# Patient Record
Sex: MALE
[De-identification: names, ages, dates, MRNs, and addresses within clinical notes are randomized per-mention and may not be internally consistent; named-entity substitution may affect disease eponyms.]

---

## 2020-12-31 ENCOUNTER — HOSPITAL ENCOUNTER (EMERGENCY)
Dept: HOSPITAL 56 - MW.ED | Age: 55
Discharge: HOME | End: 2020-12-31
Payer: COMMERCIAL

## 2020-12-31 DIAGNOSIS — K57.32: Primary | ICD-10-CM

## 2020-12-31 DIAGNOSIS — Z88.7: ICD-10-CM

## 2020-12-31 DIAGNOSIS — Z20.828: ICD-10-CM

## 2020-12-31 DIAGNOSIS — I10: ICD-10-CM

## 2020-12-31 DIAGNOSIS — Z79.899: ICD-10-CM

## 2020-12-31 LAB
BUN SERPL-MCNC: 19 MG/DL (ref 7–18)
CHLORIDE SERPL-SCNC: 102 MMOL/L (ref 98–107)
CO2 SERPL-SCNC: 24.8 MMOL/L (ref 21–32)
FLUAV RNA UPPER RESP QL NAA+PROBE: NEGATIVE
FLUBV RNA UPPER RESP QL NAA+PROBE: NEGATIVE
GLUCOSE SERPL-MCNC: 170 MG/DL (ref 74–106)
LIPASE SERPL-CCNC: 72 U/L (ref 73–393)
POTASSIUM SERPL-SCNC: 3.3 MMOL/L (ref 3.5–5.1)
SARS-COV-2 RNA RESP QL NAA+PROBE: NEGATIVE
SODIUM SERPL-SCNC: 138 MMOL/L (ref 136–148)

## 2020-12-31 RX ADMIN — Medication PRN ML: at 10:54

## 2020-12-31 RX ADMIN — IOPAMIDOL STA ML: 755 INJECTION, SOLUTION INTRAVENOUS at 12:32

## 2020-12-31 RX ADMIN — CEFEPIME HYDROCHLORIDE ONE MLS/HR: 2 INJECTION, SOLUTION INTRAVENOUS at 11:23

## 2020-12-31 RX ADMIN — KETOROLAC TROMETHAMINE ONE MG: 30 INJECTION, SOLUTION INTRAMUSCULAR at 11:36

## 2020-12-31 RX ADMIN — SODIUM CHLORIDE, PRESERVATIVE FREE PRN ML: 5 INJECTION INTRAVENOUS at 10:54

## 2020-12-31 RX ADMIN — ONDANSETRON ONE MG: 2 INJECTION, SOLUTION INTRAMUSCULAR; INTRAVENOUS at 10:54

## 2020-12-31 NOTE — CT
Indication:



Abdominal pain and fever



Technique:



Volumetric multidetector CT images of the abdomen and pelvis were obtained 

after the administration of intravenous contrast.



100 cc Isovue 370 low osmolar



Comparison:



None available.



Findings:



The lung bases are clear.



The liver is moderately enlarged with hepatic steatosis. There is mild 

focal fatty sparing adjacent to the gallbladder fossa.



The portal vein is patent.



The gallbladder is unremarkable without evidence of radiopaque calculus.



There is no significant common biliary ductal dilatation or abrupt cut off.



The spleen is normal in enhancement and size.



The stomach and duodenum are grossly unremarkable.



The pancreas is normal in enhancement without significant atrophy.



The adrenal glands are unremarkable.



The kidneys demonstrate preserved corticomedullary differentiation without 

evidence of obstructive uropathy.



There is a moderate amount of colonic diverticulosis with focal segmental 

thickening and likely mild pericolonic inflammatory change of the mid 

sigmoid colon.



The appendix is unremarkable.



There is no significant mesenteric, retroperitoneal, or pelvic sidewall 

lymph nodes.



The aorta is nonaneurysmal.  There is no significant atherosclerotic 

disease appreciated.



The solid pelvic viscera are grossly unremarkable.



There is no free fluid or free air.



The anterior abdominal wall is intact without significant hernias.



The lumbar vertebral body heights are grossly maintained in satisfactory 

alignment without evidence of displaced fracture, lytic or blastic lesion.



Impression:



Moderate colonic diverticulosis with suggestion of focal segmental 

thickening and pericolonic inflammatory change of the mid sigmoid colon 

likely representing low-grade diverticulitis. 



Moderate hepatomegaly and hepatic steatosis.



Please note that all CT scans at this facility use dose modulation, 

iterative reconstruction, and/or weight-based dosing when appropriate to 

reduce radiation dose to as low as reasonably achievable.



Dictated by Ramirez Rivera MD @ Dec 31 2020 12:54PM



Signed by Dr. Ramirez Rivera @ Dec 31 2020  1:03PM

## 2020-12-31 NOTE — CR
Indication:



Cough. Fever.



Technique:



Chest 1 view.



Comparison:



02/25/2020.



Findings:



Cardiomediastinal silhouette is unremarkable. No focal lung consolidation, 

pleural effusion or pneumothorax. No acute osseous abnormality.



Impression:



No acute cardiopulmonary abnormality.



Dictated by Lewis Packer MD @ Dec 31 2020 11:25AM



Signed by Dr. Lewis Packer @ Dec 31 2020 11:26AM

## 2020-12-31 NOTE — EDM.PDOC
ED HPI GENERAL MEDICAL PROBLEM





- General


Chief Complaint: General


Stated Complaint: POSSIBLE FLU SYMPTOMS


Time Seen by Provider: 12/31/20 10:26





- History of Present Illness


INITIAL COMMENTS - FREE TEXT/NARRATIVE: 


55-year-old male with history of hypertension as well as rheumatoid arthritis 

for which he takes Humira who is presenting with multiple symptoms.  Patient 

states he was in his normal state of health until 3 AM this morning when he woke

up with significant nausea nonbloody nonbilious emesis as well as small volume 

diarrhea and some ongoing dry heaves.  Is associated with fatigue cough and 

severe chills.  He was seen briefly at a walk-in clinic rapid Covid antigen test

was negative at that time and he was referred in for additional work-up.  

Patient reports a constant burning nonradiating substernal chest discomfort as 

well.  He denies abdominal pain he denies prior history of abdominal surgery.  

He reports that he had a similar but less severe episode in January that was 

thought to be pneumonia.  No exacerbating or alleviating factors radiation or 

other associated symptoms.


  ** Chest


Pain Score (Numeric/FACES): 4





- Related Data


                                    Allergies











Allergy/AdvReac Type Severity Reaction Status Date / Time


 


rabies immune globulin Allergy  Cannot Verified 12/31/20 10:28





[Rabies Immune Globulin]   Remember  











Home Meds: 


                                    Home Meds





Ciprofloxacin [Ciprofloxacin HCl] 500 mg PO BID 7 Days #14 tab 12/31/20 [Rx]


Metoprolol/Hydrochlorothiazide [Metoprolol-HCTZ 100-25 MG] 1 tab PO DAILY 

12/31/20 [History]


hydroCHLOROthiazide [Hydrochlorothiazide] 1 tab PO DAILY 12/31/20 [History]


metroNIDAZOLE [Flagyl] 500 mg PO Q8H 7 Days #21 tab 12/31/20 [Rx]


sulfaSALAzine [Sulfazine] 1 tab PO DAILY 12/31/20 [History]











ED ROS GENERAL





- Review of Systems


Review Of Systems: See Below


Free Text/Narrative/Comment: 


General: Per HPI


Skin: No rash.


Eyes: No vision problems.


ENT: No sore throat.


Neck: No neck stiffness.


Respiratory: Per HPI


Cardiac: Per HPI


Gastrointestinal: Per HPI


Musculoskeletal: Per HPI


Neurologic: No headache.





ED EXAM, GENERAL





- Physical Exam


Exam: See Below


Free Text/Narrative:: 





General Appearance: Pale, diaphoretic, but in no acute distress


Skin: No rash


HEENT: Normocephalic/atraumatic, sclera anicteric, mucous membranes moist


Neck: Normal range of motion


Chest and Lungs: Speaks in short sentences, breath sounds clear at the apices 

but would not or could not lean forward for full pulmonary exam


Cardiovascular: Regular rate and rhythm, no murmur


Abdomen: Soft, non-tender


Back: Normal


Musculoskeletal: No edema or tenderness


Neurologic: Awake, alert, no obvious deficits, moving all extremities


Psychiatric: Appropriate, cooperative


  ** #1 Interpretation


EKG Date: 12/31/20


Time: 10:35


EKG Interpretation Comments: 





Normal sinus rhythm with a rate of 70 normal intervals and axis no acute 

ischemia





Course





- Vital Signs


Last Recorded V/S: 


                                Last Vital Signs











Temp  97.1 F   12/31/20 11:00


 


Pulse  92   12/31/20 12:30


 


Resp  17   12/31/20 12:30


 


BP  170/95 H  12/31/20 12:30


 


Pulse Ox  97   12/31/20 12:30














- Orders/Labs/Meds


Orders: 


                               Active Orders 24 hr











 Category Date Time Status


 


 EKG Documentation Completion [RC] STAT Care  12/31/20 10:28 Active


 


 CULTURE BLOOD [BC] Stat Lab  12/31/20 10:34 Received


 


 CULTURE BLOOD [BC] Stat Lab  12/31/20 11:34 Received


 


 Sodium Chloride 0.9% [Saline Flush] Med  12/31/20 10:28 Active





 10 ml FLUSH ASDIRECTED PRN   


 


 Sodium Chloride 0.9% [Saline Flush] Med  12/31/20 10:28 Active





 2.5 ml FLUSH ASDIRECTED PRN   


 


 Blood Culture x2 Reflex Set [OM.PC] Stat Oth  12/31/20 11:00 Ordered


 


 Saline Lock Insert [OM.PC] Stat Oth  12/31/20 10:28 Ordered








                                Medication Orders





Sodium Chloride (Saline Flush)  10 ml FLUSH ASDIRECTED PRN


   PRN Reason: Keep Vein Open


   Last Admin: 12/31/20 10:54  Dose: 10 ml


   Documented by: [id number]


Sodium Chloride (Saline Flush)  2.5 ml FLUSH ASDIRECTED PRN


   PRN Reason: Keep Vein Open


   Last Admin: 12/31/20 10:54  Dose: 2.5 ml


   Documented by: [id number]








Labs: 


                                Laboratory Tests











  12/31/20 12/31/20 12/31/20 Range/Units





  10:35 10:35 10:35 


 


WBC  17.58 H    (4.0-11.0)  K/uL


 


RBC  5.10    (4.50-5.90)  M/uL


 


Hgb  16.0    (13.0-17.0)  g/dL


 


Hct  46.8    (38.0-50.0)  %


 


MCV  91.8    (80.0-98.0)  fL


 


MCH  31.4    (27.0-32.0)  pg


 


MCHC  34.2    (31.0-37.0)  g/dL


 


RDW Std Deviation  44.4    (28.0-62.0)  fl


 


RDW Coeff of Earl  13    (11.0-15.0)  %


 


Plt Count  204    (150-400)  K/uL


 


MPV  10.20    (7.40-12.00)  fL


 


Neut % (Auto)  83.9 H    (48.0-80.0)  %


 


Lymph % (Auto)  9.8 L    (16.0-40.0)  %


 


Mono % (Auto)  6.0    (0.0-15.0)  %


 


Eos % (Auto)  0.1    (0.0-7.0)  %


 


Baso % (Auto)  0.2    (0.0-1.5)  %


 


Neut # (Auto)  14.8 H    (1.4-5.7)  K/uL


 


Lymph # (Auto)  1.7    (0.6-2.4)  K/uL


 


Mono # (Auto)  1.1 H    (0.0-0.8)  K/uL


 


Eos # (Auto)  0.0    (0.0-0.7)  K/uL


 


Baso # (Auto)  0.0    (0.0-0.1)  K/uL


 


Nucleated RBC %  0.0    /100WBC


 


Nucleated RBCs #  0    K/uL


 


Lactate   2.3 H*   (0.20-2.00)  mmol/L


 


Sodium    138  (136-148)  mmol/L


 


Potassium    3.3 L  (3.5-5.1)  mmol/L


 


Chloride    102  ()  mmol/L


 


Carbon Dioxide    24.8  (21.0-32.0)  mmol/L


 


BUN    19 H  (7.0-18.0)  mg/dL


 


Creatinine    1.0  (0.8-1.3)  mg/dL


 


Est Cr Clr Drug Dosing    79.80  mL/min


 


Estimated GFR (MDRD)    > 60.0  ml/min


 


Glucose    170 H  ()  mg/dL


 


Calcium    9.7  (8.5-10.1)  mg/dL


 


Total Bilirubin    0.9  (0.2-1.0)  mg/dL


 


AST    36  (15-37)  IU/L


 


ALT    63  (14-63)  IU/L


 


Alkaline Phosphatase    55  ()  U/L


 


Troponin I    < 0.050  (0.000-0.056)  ng/mL


 


Total Protein    8.3 H  (6.4-8.2)  g/dL


 


Albumin    5.0  (3.4-5.0)  g/dL


 


Globulin    3.3  (2.6-4.0)  g/dL


 


Albumin/Globulin Ratio    1.5  (0.9-1.6)  


 


Lipase    72 L  ()  U/L


 


Urine Color     


 


Urine Appearance     


 


Urine pH     (5.0-8.0)  


 


Ur Specific Gravity     (1.001-1.035)  


 


Urine Protein     (NEGATIVE)  mg/dL


 


Urine Glucose (UA)     (NEGATIVE)  mg/dL


 


Urine Ketones     (NEGATIVE)  mg/dL


 


Urine Occult Blood     (NEGATIVE)  


 


Urine Nitrite     (NEGATIVE)  


 


Urine Bilirubin     (NEGATIVE)  


 


Urine Urobilinogen     (<2.0)  EU/dL


 


Ur Leukocyte Esterase     (NEGATIVE)  


 


Urine RBC     (0-2/HPF)  


 


Urine WBC     (0-5/HPF)  


 


Ur Epithelial Cells     (NONE-FEW)  


 


Urine Bacteria     (NEGATIVE)  


 


Urine Mucus     (NONE-MOD)  


 


Influenza Type A RNA     (NEGATIVE)  


 


Influenza Type B RNA     (NEGATIVE)  


 


SARS-CoV-2 RNA (DIA)     (NEGATIVE)  














  12/31/20 12/31/20 12/31/20 Range/Units





  10:45 12:29 13:24 


 


WBC     (4.0-11.0)  K/uL


 


RBC     (4.50-5.90)  M/uL


 


Hgb     (13.0-17.0)  g/dL


 


Hct     (38.0-50.0)  %


 


MCV     (80.0-98.0)  fL


 


MCH     (27.0-32.0)  pg


 


MCHC     (31.0-37.0)  g/dL


 


RDW Std Deviation     (28.0-62.0)  fl


 


RDW Coeff of Earl     (11.0-15.0)  %


 


Plt Count     (150-400)  K/uL


 


MPV     (7.40-12.00)  fL


 


Neut % (Auto)     (48.0-80.0)  %


 


Lymph % (Auto)     (16.0-40.0)  %


 


Mono % (Auto)     (0.0-15.0)  %


 


Eos % (Auto)     (0.0-7.0)  %


 


Baso % (Auto)     (0.0-1.5)  %


 


Neut # (Auto)     (1.4-5.7)  K/uL


 


Lymph # (Auto)     (0.6-2.4)  K/uL


 


Mono # (Auto)     (0.0-0.8)  K/uL


 


Eos # (Auto)     (0.0-0.7)  K/uL


 


Baso # (Auto)     (0.0-0.1)  K/uL


 


Nucleated RBC %     /100WBC


 


Nucleated RBCs #     K/uL


 


Lactate    2.0  (0.20-2.00)  mmol/L


 


Sodium     (136-148)  mmol/L


 


Potassium     (3.5-5.1)  mmol/L


 


Chloride     ()  mmol/L


 


Carbon Dioxide     (21.0-32.0)  mmol/L


 


BUN     (7.0-18.0)  mg/dL


 


Creatinine     (0.8-1.3)  mg/dL


 


Est Cr Clr Drug Dosing     mL/min


 


Estimated GFR (MDRD)     ml/min


 


Glucose     ()  mg/dL


 


Calcium     (8.5-10.1)  mg/dL


 


Total Bilirubin     (0.2-1.0)  mg/dL


 


AST     (15-37)  IU/L


 


ALT     (14-63)  IU/L


 


Alkaline Phosphatase     ()  U/L


 


Troponin I     (0.000-0.056)  ng/mL


 


Total Protein     (6.4-8.2)  g/dL


 


Albumin     (3.4-5.0)  g/dL


 


Globulin     (2.6-4.0)  g/dL


 


Albumin/Globulin Ratio     (0.9-1.6)  


 


Lipase     ()  U/L


 


Urine Color   YELLOW   


 


Urine Appearance   CLEAR   


 


Urine pH   8.0   (5.0-8.0)  


 


Ur Specific Gravity   1.020   (1.001-1.035)  


 


Urine Protein   NEGATIVE   (NEGATIVE)  mg/dL


 


Urine Glucose (UA)   NEGATIVE   (NEGATIVE)  mg/dL


 


Urine Ketones   40 H   (NEGATIVE)  mg/dL


 


Urine Occult Blood   TRACE-INTACT H   (NEGATIVE)  


 


Urine Nitrite   NEGATIVE   (NEGATIVE)  


 


Urine Bilirubin   NEGATIVE   (NEGATIVE)  


 


Urine Urobilinogen   0.2   (<2.0)  EU/dL


 


Ur Leukocyte Esterase   NEGATIVE   (NEGATIVE)  


 


Urine RBC   2-3   (0-2/HPF)  


 


Urine WBC   0-1   (0-5/HPF)  


 


Ur Epithelial Cells   RARE   (NONE-FEW)  


 


Urine Bacteria   RARE   (NEGATIVE)  


 


Urine Mucus   RARE   (NONE-MOD)  


 


Influenza Type A RNA  NEGATIVE    (NEGATIVE)  


 


Influenza Type B RNA  NEGATIVE    (NEGATIVE)  


 


SARS-CoV-2 RNA (DIA)  NEGATIVE    (NEGATIVE)  











Meds: 


Medications











Generic Name Dose Route Start Last Admin





  Trade Name Freq  PRN Reason Stop Dose Admin


 


Sodium Chloride  10 ml  12/31/20 10:28  12/31/20 10:54





  Saline Flush  FLUSH   10 ml





  ASDIRECTED PRN   Administration





  Keep Vein Open  


 


Sodium Chloride  2.5 ml  12/31/20 10:28  12/31/20 10:54





  Saline Flush  FLUSH   2.5 ml





  ASDIRECTED PRN   Administration





  Keep Vein Open  














Discontinued Medications














Generic Name Dose Route Start Last Admin





  Trade Name Freq  PRN Reason Stop Dose Admin


 


Lactated Ringer's  1,000 mls @ 999 mls/hr  12/31/20 10:55  12/31/20 10:57





  Ringers, Lactated  IV  12/31/20 11:55  999 mls/hr





  .BOLUS ONE   Administration


 


Cefepime HCl 2 gm/ Premix  50 mls @ 100 mls/hr  12/31/20 11:10  12/31/20 11:23





  IV  12/31/20 11:39  100 mls/hr





  ONETIME ONE   Administration


 


Vancomycin HCl 1 gm/ Sodium  250 mls @ 166 mls/hr  12/31/20 11:10  12/31/20 

11:23





  Chloride  IV  12/31/20 12:40  166 mls/hr





  ONETIME ONE   Administration


 


Iopamidol  100 ml  12/31/20 12:29  12/31/20 12:32





  Isovue Multipack-370 (76%)  IVPUSH  12/31/20 12:30  100 ml





  ONETIME STA   Administration


 


Ketorolac Tromethamine  15 mg  12/31/20 11:32  12/31/20 11:36





  Toradol  IVPUSH  12/31/20 11:33  15 mg





  ONETIME ONE   Administration


 


Ondansetron HCl  4 mg  12/31/20 10:41  12/31/20 10:54





  Zofran  IVPUSH  12/31/20 10:42  4 mg





  ONETIME ONE   Administration














Departure





- Departure


Time of Disposition: 13:39


Disposition: Home, Self-Care 01


Condition: Fair


Clinical Impression: 


 Diverticulitis








- Discharge Information


*PRESCRIPTION DRUG MONITORING PROGRAM REVIEWED*: Not Applicable


*COPY OF PRESCRIPTION DRUG MONITORING REPORT IN PATIENT PONCHO: Not Applicable


Prescriptions: 


Ciprofloxacin [Ciprofloxacin HCl] 500 mg PO BID 7 Days #14 tab


metroNIDAZOLE [Flagyl] 500 mg PO Q8H 7 Days #21 tab


Instructions:  Diverticulitis


Referrals: 


Sterling Dunbar MD [Primary Care Provider] - 1 Week


Forms:  ED Department Discharge


Additional Instructions: 


It is very important that if your symptoms do not improve or if you feel worse 

that you return to the emergency department for another assessment.  The oral 

antibiotics should help with your diverticulitis.  However, it is possible that 

it could worsen in spite of the antibiotics.  If you develop ongoing abdominal 

pain persistent fevers do not improve please return to the ER for another 

evaluation and consideration for admission for IV antibiotics.





The following information is given to patients seen in the emergency department 

who are being discharged to home. This information is to outline your options 

for follow-up care. We provide all patients seen in our emergency department 

with a follow-up referral.





The need for follow-up, as well as the timing and circumstances, are variable 

depending upon the specifics of your emergency department visit.





If you don't have a primary care physician on staff, we will provide you with a 

referral. We always advise you to contact your personal physician following an 

emergency department visit to inform them of the circumstance of the visit and 

for follow-up with them and/or the need for any referrals to a consulting 

specialist.





The emergency department will also refer you to a specialist when appropriate. 

This referral assures that you have the opportunity for follow-up care with a 

specialist. All of these measure are taken in an effort to provide you with 

optimal care, which includes your follow-up.





Under all circumstances we always encourage you to contact your private 

physician who remains a resource for coordinating your care. When calling for 

follow-up care, please make the office aware that this follow-up is from your 

recent emergency room visit. If for any reason you are refused follow-up, please

contact the [facility] Emergency Department

at [phone] and asked to speak to the emergency department charge nurse.





Sepsis Event Note (ED)





- Focused Exam


Vital Signs: 


                                   Vital Signs











  Temp Temp Pulse Resp BP Pulse Ox


 


 12/31/20 12:30    92  17  170/95 H  97


 


 12/31/20 12:00    91   154/84 H  97


 


 12/31/20 11:45    95  18  150/81 H  94 L


 


 12/31/20 11:30    98  18  150/99 H  98


 


 12/31/20 11:15    79  18  158/77 H  96


 


 12/31/20 11:00  97.1 F   77  18  152/87 H  97


 


 12/31/20 10:30   94.8 F L  103 H  18  178/102 H  97














- My Orders


Last 24 Hours: 


My Active Orders





12/31/20 10:28


EKG Documentation Completion [RC] STAT 


Sodium Chloride 0.9% [Saline Flush]   10 ml FLUSH ASDIRECTED PRN 


Sodium Chloride 0.9% [Saline Flush]   2.5 ml FLUSH ASDIRECTED PRN 


Saline Lock Insert [OM.PC] Stat 





12/31/20 10:34


CULTURE BLOOD [BC] Stat 





12/31/20 11:00


Blood Culture x2 Reflex Set [OM.PC] Stat 





12/31/20 11:34


CULTURE BLOOD [BC] Stat 














- Assessment/Plan


Last 24 Hours: 


My Active Orders





12/31/20 10:28


EKG Documentation Completion [RC] STAT 


Sodium Chloride 0.9% [Saline Flush]   10 ml FLUSH ASDIRECTED PRN 


Sodium Chloride 0.9% [Saline Flush]   2.5 ml FLUSH ASDIRECTED PRN 


Saline Lock Insert [OM.PC] Stat 





12/31/20 10:34


CULTURE BLOOD [BC] Stat 





12/31/20 11:00


Blood Culture x2 Reflex Set [OM.PC] Stat 





12/31/20 11:34


CULTURE BLOOD [BC] Stat 











Plan: 


55-year-old male presenting with signs and symptoms that seem most consistent wi

th viral syndrome rapid antigen test for Covid are inherently inaccurate given 

current testing guidelines will swab for Covid and flu.  Pneumonia is a 

possibility chest x-ray ordered patient is not in acute respiratory distress at 

this time.  Abdominal exam is benign I think primary acute abdominal infection 

is unlikely though appendicitis diverticulitis pancreatitis biliary pathology 

were all considered.  Could consider imaging the right upper quadrant if LFTs 

abnormal.  No findings that would necessarily suggest UTI.  Sepsis considered 

lactate CBC CMP pending EKG without acute ischemia but troponin added think ACS 

less likely but also considered.  Nothing that suggest aortic dissection or PE 

to me at this time.





1111: Lactate is only very minimally elevated.  However patient does have some 

signs that suggest early sepsis.  He has a leukocytosis as well.  1 L of LR has 

been ordered.  However, given the minimal lactate elevation the concern for 

potential Covid a full 30 cc/kg fluid bolus is felt to carry significant risk 

for the patient and so will not be ordered.  Single dose of broad-spectrum 

antibiotics ordered as well.





1130:  CXR is unremarkable.  We are awaiting flu/COVID result. If these are 

negative as well then would consider CT a/p to r/o intrabdominal source.  Pt has

 no neck stiffness, signs of meningismus or other indication that would suggest 

meningitis. No AMS that would suggest encephalitis. 





1320: CT scan shows mild diverticulitis without sign of perforation or abscess 

or other complication. Patient's vital signs remain normal. He reports his 

abdominal pain is improved. I recommended admission for continued inpatient 

treatment. However the patient strongly desires discharge. Repeat lactate is 

pending at this time if this is normal then we will send the patient home on 

oral antibiotics with strict return precautions if abnormal and he agrees to 

stay.





My sepsis focused reassessment reveals a nontoxic appearing patient with out 

signs of poor perfusion, normal HR and no hypotension.





1340:  Pt's repeat LA is normal at 2.0.  Given this will dc on cipro / flagyl 

with strict return precautions.

## 2021-05-19 ENCOUNTER — HOSPITAL ENCOUNTER (EMERGENCY)
Dept: HOSPITAL 56 - MW.ED | Age: 56
Discharge: HOME | End: 2021-05-19
Payer: COMMERCIAL

## 2021-05-19 DIAGNOSIS — E78.00: ICD-10-CM

## 2021-05-19 DIAGNOSIS — Z79.899: ICD-10-CM

## 2021-05-19 DIAGNOSIS — I10: ICD-10-CM

## 2021-05-19 DIAGNOSIS — Z88.7: ICD-10-CM

## 2021-05-19 DIAGNOSIS — K57.32: ICD-10-CM

## 2021-05-19 DIAGNOSIS — K52.9: Primary | ICD-10-CM

## 2021-05-19 LAB
BUN SERPL-MCNC: 12 MG/DL (ref 7–18)
CHLORIDE SERPL-SCNC: 103 MMOL/L (ref 98–107)
CO2 SERPL-SCNC: 24.6 MMOL/L (ref 21–32)
GLUCOSE SERPL-MCNC: 151 MG/DL (ref 74–106)
LIPASE SERPL-CCNC: 54 U/L (ref 73–393)
POTASSIUM SERPL-SCNC: 3.2 MMOL/L (ref 3.5–5.1)
SODIUM SERPL-SCNC: 140 MMOL/L (ref 136–148)

## 2021-05-19 PROCEDURE — 96374 THER/PROPH/DIAG INJ IV PUSH: CPT

## 2021-05-19 PROCEDURE — 81001 URINALYSIS AUTO W/SCOPE: CPT

## 2021-05-19 PROCEDURE — 74177 CT ABD & PELVIS W/CONTRAST: CPT

## 2021-05-19 PROCEDURE — 99284 EMERGENCY DEPT VISIT MOD MDM: CPT

## 2021-05-19 PROCEDURE — 93005 ELECTROCARDIOGRAM TRACING: CPT

## 2021-05-19 PROCEDURE — 83690 ASSAY OF LIPASE: CPT

## 2021-05-19 PROCEDURE — 80053 COMPREHEN METABOLIC PANEL: CPT

## 2021-05-19 PROCEDURE — 84484 ASSAY OF TROPONIN QUANT: CPT

## 2021-05-19 PROCEDURE — 85025 COMPLETE CBC W/AUTO DIFF WBC: CPT

## 2021-05-19 PROCEDURE — 96375 TX/PRO/DX INJ NEW DRUG ADDON: CPT

## 2021-05-19 PROCEDURE — 80307 DRUG TEST PRSMV CHEM ANLYZR: CPT

## 2021-05-19 PROCEDURE — 36415 COLL VENOUS BLD VENIPUNCTURE: CPT

## 2021-05-19 NOTE — CT
INDICATION:



Left lower quadrant pain 



TECHNIQUE:



CT abdomen and pelvis acquired with IV contrast. 100 mL of Isovue 370 

administered. 



COMPARISON:



None available 



FINDINGS:



Lower chest: Unremarkable.  



Liver: Hepatomegaly measuring 22.3 cm craniocaudally. Hepatic steatosis. 



Spleen: Mild splenomegaly measuring 14.5 cm craniocaudally. 



Pancreas: Unremarkable.  



Gallbladder and bile ducts: Unremarkable.  



Adrenal glands: A punctate left adrenal calcification, probably 

postinfectious versus sequela of prior hemorrhage. 



Kidneys: Unremarkable.  



GI tract: A proximal gastric fold versus decompressed diverticulum. No 

bowel obstruction. Normal appendix. Mild ascending colon wall thickening, 

partially related to underdistention. Left colonic diverticulosis without 

lincoln diverticulitis. Mild wall thickening of a segment of the proximal to 

mid sigmoid colon. 



Vascular structures: Mild atherosclerotic changes. 



Lymph nodes: Borderline and shotty subcentimeter periportal and portacaval 

lymph nodes, nonspecific, which could be reactive in the setting of hepatic 

steatosis. 



Miscellaneous: No significant free fluid or free air. Mild diastasis of the 

anterior abdominal wall musculature with a small superimposed fat 

containing umbilical hernia. Small fat containing inguinal hernias. 



Pelvic Organs: A mildly enlarged prostate. Borderline bladder wall 

thickening versus incomplete distention. 



Bones: Unremarkable for age.  



IMPRESSION:



Colonic diverticulosis without lincoln diverticulitis. Mild ascending colon 

wall thickening and mild wall thickening in a segment of the proximal to 

mid sigmoid colon. Correlate for multifocal colitis. Colonoscopic 

evaluation is recommended to exclude underlying colonic lesion.



Hepatosplenomegaly. Hepatic steatosis.



Borderline bladder wall thickening versus incomplete distention. Correlate 

with urinalysis. A mildly enlarged prostate.



Please note that all CT scans at this facility use dose modulation, 

iterative reconstruction, and/or weight-based dosing when appropriate to 

reduce radiation dose to as low as reasonably achievable.



Dictated by Dale Zamora MD @ 5/19/2021 4:13:03 AM



Signed by Dr. Dale Zamora @ May 19 2021  4:13AM

## 2021-05-19 NOTE — EDM.PDOC
ED HPI GENERAL MEDICAL PROBLEM





- General


Chief Complaint: General


Stated Complaint: SICK


Time Seen by Provider: 05/19/21 00:57





- History of Present Illness


INITIAL COMMENTS - FREE TEXT/NARRATIVE: 





HISTORY AND PHYSICAL:





History of present illness:


This is a 56-year-old gentleman with a history significant for hypertension, 

rheumatoid arthritis, recently treated for diverticulitis, who presents ER today

reporting identical symptoms to his prior flareup of diverticulitis in the past.

 Patient reports he has pain in his left side of his abdomen greatest in the 

left lower quadrant.  Patient denies any recent fevers, shakes, chills.  Patient

reports that he feels sweaty and cold.  Patient denies any vomiting or diarrhea.

 Patient reports he moved his bowels prior to arrival to the ED and they were 

normal.  Patient denies any melena or bright red blood per rectum.  Patient 

denies any dysuria, frequency, urgency, hematuria.  Patient has any chest pain, 

shortness of breath, pain rating down his back jaw or midepigastric region.  

Patient reports that the pain started approximately 10 PM.  Patient's wife 

reports that she gave him lasagna with tomatoes and thinks that the tomatoes 

might of flared up his diverticulitis.  Patient reports that he was in his usual

state of health all day today until 10 PM when his symptoms started.





Review of systems: 


As per history of present illness and below otherwise all systems reviewed and 

negative.





Past medical history: 


As per history of present illness and as reviewed below otherwise 

noncontributory.





Surgical history: 


As per history of present illness and as reviewed below otherwise 

noncontributory.





Social history: 


No reported history of drug abuse.





Family history: 


As per history of present illness and as reviewed below otherwise 

noncontributory.





Physical exam:





This patient was seen and evaluated during the 2020 SARS-CoV-2 novel coronavirus

pandemic period.  Community viral transmission is ongoing at time of this 

encounter and the emergency department is operating under pandemic response 

procedures.





Constitutional: Patient is oriented to person, place, and time.  Appears well-

developed and well-nourished.  No distress.


 HEENT: Moist mucous membranes


 Head: Normocephalic and atraumatic


 Eyes: Right eye exhibits no discharge.  Left eye exhibits no discharge.  No 

scleral icterus


 Neck: Normal range of motion.  No tracheal deviation present.


 Cardiovascular: Normal rate and regular rhythm.


 Pulmonary: Effort normal, no respiratory distress.


Abd:  Soft, nondistended, no rebound/guarding, no psoas or obturator signs, no 

tenderness at Mcberney's point, no Hendrix's sign.  Pt does not present with an 

exam that would be consistent with an acute surgical abdomen at this time, pain 

greatest in the left lower quadrant.  No palpable masses, no pulsatile masses.


 Musculoskeletal: Normal range of motion


 Neurologic: Alert and oriented to person, place and time.


 Skin: Pink, warm and dry with episode of diaphoresis when he has cramping to 

his abdomen.


 Psychiatric: Normal mood and affect.  Behavior is normal.  Judgment and thought

content normal.


 Nursing note and vital signs have been reviewed








Diagnostics:


CT scan of the abdomen pelvis reviewed by radiology.  CT revealed colonic 

diverticulosis without lincoln diverticulitis.  Mild descending colon wall 

thickening and mild wall thickening in a segment of the proximal to mid sigmoid 

colon.  Correlate for multifocal colitis.





Therapeutics:


Patient given adequate analgesia IV fluids and antiemetics in the ED and feels 

much improved.





Assessment and plan:


56-year-old gentleman who presents ER today secondary to abdominal pain similar 

to his prior episodes of diverticulitis.  Patient's CT scan reveals evidence of 

multifocal colitis.  It is unclear whether or not this may be related to early 

diverticulitis versus ulcerative colitis versus Crohn's colitis.  Patient is 

currently being treated for rheumatoid arthritis.  Given patient's symptoms and 

the multifocal colitis, we will start the patient on Cipro and Flagyl and will 

discharge him home with Ultram and Zofran to assist with pain and discomfort.  

Patient does have a primary care physician here in town and will be able to 

follow-up in the next 1 to 2 days.  I have discussed with the patient and his 

wife the need to return to the ED if his symptoms should worsen or if he should 

change his mind and wish to be admitted to the hospital.  At this time, I have 

discussed with him the options of outpatient treatment versus inpatient and he 

feels comfortable to be able to be discharged home at this time.





Reassessment at the time of disposition demonstrates that the patient is in no 

acute distress.  The patient has remained stable throughout the entire ED visit 

and is without objective evidence for acute process requiring urgent in

tervention or hospitalization. The patient is stable for discharge, counseling 

is provided as documented above, discussed symptomatic treatment and specific 

conditions for return.





I have spoken with the patient/caregiver and discussed todays findings, in 

addition to providing specific details for the plan of care. Questions are 

answered and there is agreement with the plan.





Definitive disposition and diagnosis as appropriate pending reevaluation and 

review of above.








  ** abd


Pain Score (Numeric/FACES): 8





- Related Data


                                    Allergies











Allergy/AdvReac Type Severity Reaction Status Date / Time


 


rabies immune globulin Allergy  Cannot Verified 05/19/21 00:34





[Rabies Immune Globulin]   Remember  











Home Meds: 


                                    Home Meds





Ciprofloxacin [Ciprofloxacin HCl] 500 mg PO BID 7 Days #14 tab 12/31/20 [Rx]


Metoprolol/Hydrochlorothiazide [Metoprolol-HCTZ 100-25 MG] 1 tab PO DAILY 

12/31/20 [History]


hydroCHLOROthiazide [Hydrochlorothiazide] 1 tab PO DAILY 12/31/20 [History]


metroNIDAZOLE [Flagyl] 500 mg PO Q8H 7 Days #21 tab 12/31/20 [Rx]


sulfaSALAzine [Sulfazine] 1 tab PO DAILY 12/31/20 [History]


Ciprofloxacin HCl [Cipro] 500 mg PO Q12HR #20 tablet 05/19/21 [Rx]


Ondansetron [Zofran ODT] 4 mg PO Q6H PRN #12 tab.dis 05/19/21 [Rx]


metroNIDAZOLE [Flagyl] 500 mg PO Q8H #30 tab 05/19/21 [Rx]


traMADol [Ultram] 50 mg PO Q6H PRN #12 tab 05/19/21 [Rx]











Past Medical History


Cardiovascular History: Reports: High Cholesterol, Hypertension


Musculoskeletal History: Reports: Arthritis


Psychiatric History: Reports: None





- Infectious Disease History


Infectious Disease History: Reports: Chicken Pox





ED ROS GENERAL





- Review of Systems


Review Of Systems: See Below





ED EXAM, GENERAL





- Physical Exam


Exam: See Below


  ** #1 Interpretation


EKG Interpretation Comments: 





EKG:


As interpreted by ER physician: Isael:


Nonspecific ST-T wave abnormalities


Normal axis


No evidence of ST elevation MI


Normal sinus rhythm heart rate of 71





Course





- Vital Signs


Last Recorded V/S: 





                                Last Vital Signs











Temp  97.2 F   05/19/21 03:42


 


Pulse  93   05/19/21 04:19


 


Resp  20   05/19/21 04:19


 


BP  173/103 H  05/19/21 04:19


 


Pulse Ox  97   05/19/21 04:19














- Orders/Labs/Meds


Orders: 





                               Active Orders 24 hr











 Category Date Time Status


 


 EKG Documentation Completion [RC] AM Care  05/19/21 00:58 Active


 


 Ciprofloxacin [Ciprofloxacin HCl] Med  05/19/21 04:22 Once





 500 mg PO ONETIME ONE   


 


 Sodium Chloride 0.9% [Saline Flush] Med  05/19/21 00:58 Active





 10 ml FLUSH ASDIRECTED PRN   


 


 Sodium Chloride 0.9% [Saline Flush] Med  05/19/21 00:58 Active





 2.5 ml FLUSH ASDIRECTED PRN   


 


 metroNIDAZOLE Med  05/19/21 04:22 Once





 500 mg PO ONETIME ONE   


 


 Saline Lock Insert [OM.PC] Stat Oth  05/19/21 00:58 Ordered








                                Medication Orders





Sodium Chloride (Sodium Chloride 0.9% 10 Ml Syringe)  10 ml FLUSH ASDIRECTED PRN


   PRN Reason: Keep Vein Open


   Last Admin: 05/19/21 02:04  Dose: 10 ml


   Documented by: JOSHUA


Sodium Chloride (Sodium Chloride 0.9% 2.5 Ml Syringe)  2.5 ml FLUSH ASDIRECTED 

PRN


   PRN Reason: Keep Vein Open


   Last Admin: 05/19/21 02:04  Dose: 2.5 ml


   Documented by: JOSHUA








Labs: 





                                Laboratory Tests











  05/19/21 05/19/21 05/19/21 Range/Units





  01:30 01:30 02:21 


 


WBC  16.04 H    (4.0-11.0)  K/uL


 


RBC  4.79    (4.50-5.90)  M/uL


 


Hgb  15.1    (13.0-17.0)  g/dL


 


Hct  43.1    (38.0-50.0)  %


 


MCV  90.0    (80.0-98.0)  fL


 


MCH  31.5    (27.0-32.0)  pg


 


MCHC  35.0    (31.0-37.0)  g/dL


 


RDW Std Deviation  41.1    (28.0-62.0)  fl


 


RDW Coeff of Earl  13    (11.0-15.0)  %


 


Plt Count  195    (150-400)  K/uL


 


MPV  9.80    (7.40-12.00)  fL


 


Neut % (Auto)  77.4    (48.0-80.0)  %


 


Lymph % (Auto)  13.7 L    (16.0-40.0)  %


 


Mono % (Auto)  7.9    (0.0-15.0)  %


 


Eos % (Auto)  0.6    (0.0-7.0)  %


 


Baso % (Auto)  0.4    (0.0-1.5)  %


 


Neut # (Auto)  12.4 H    (1.4-5.7)  K/uL


 


Lymph # (Auto)  2.2    (0.6-2.4)  K/uL


 


Mono # (Auto)  1.3 H    (0.0-0.8)  K/uL


 


Eos # (Auto)  0.1    (0.0-0.7)  K/uL


 


Baso # (Auto)  0.1    (0.0-0.1)  K/uL


 


Sodium   140   (136-148)  mmol/L


 


Potassium   3.2 L   (3.5-5.1)  mmol/L


 


Chloride   103   ()  mmol/L


 


Carbon Dioxide   24.6   (21.0-32.0)  mmol/L


 


BUN   12   (7.0-18.0)  mg/dL


 


Creatinine   0.9   (0.8-1.3)  mg/dL


 


Est Cr Clr Drug Dosing   87.61   mL/min


 


Estimated GFR (MDRD)   > 60.0   ml/min


 


Glucose   151 H   ()  mg/dL


 


Calcium   8.3 L   (8.5-10.1)  mg/dL


 


Total Bilirubin   0.6   (0.2-1.0)  mg/dL


 


AST   21   (15-37)  IU/L


 


ALT   44   (14-63)  IU/L


 


Alkaline Phosphatase   58   ()  U/L


 


Troponin I   < 0.050   (0.000-0.056)  ng/mL


 


Total Protein   7.4   (6.4-8.2)  g/dL


 


Albumin   4.1   (3.4-5.0)  g/dL


 


Globulin   3.3   (2.6-4.0)  g/dL


 


Albumin/Globulin Ratio   1.2   (0.9-1.6)  


 


Lipase   54 L   ()  U/L


 


Urine Color    YELLOW  


 


Urine Appearance    CLEAR  


 


Urine pH    6.0  (5.0-8.0)  


 


Ur Specific Gravity    1.020  (1.001-1.035)  


 


Urine Protein    NEGATIVE  (NEGATIVE)  mg/dL


 


Urine Glucose (UA)    NEGATIVE  (NEGATIVE)  mg/dL


 


Urine Ketones    15 H  (NEGATIVE)  mg/dL


 


Urine Occult Blood    TRACE-INTACT H  (NEGATIVE)  


 


Urine Nitrite    NEGATIVE  (NEGATIVE)  


 


Urine Bilirubin    NEGATIVE  (NEGATIVE)  


 


Urine Urobilinogen    0.2  (<2.0)  EU/dL


 


Ur Leukocyte Esterase    NEGATIVE  (NEGATIVE)  


 


Urine RBC    NONE SEEN  (0-2/HPF)  


 


Urine WBC    0-1  (0-5/HPF)  


 


Ur Epithelial Cells    NOT SEEN  (NONE-FEW)  


 


Amorphous Sediment    FEW  (NEGATIVE)  


 


Urine Bacteria    FEW  (NEGATIVE)  


 


Urine Mucus    LIGHT  (NONE-MOD)  


 


Ethyl Alcohol   < 3.0   mg/dL











Meds: 





Medications











Generic Name Dose Route Start Last Admin





  Trade Name Freq  PRN Reason Stop Dose Admin


 


Sodium Chloride  10 ml  05/19/21 00:58  05/19/21 02:04





  Sodium Chloride 0.9% 10 Ml Syringe  FLUSH   10 ml





  ASDIRECTED PRN   Administration





  Keep Vein Open  


 


Sodium Chloride  2.5 ml  05/19/21 00:58  05/19/21 02:04





  Sodium Chloride 0.9% 2.5 Ml Syringe  FLUSH   2.5 ml





  ASDIRECTED PRN   Administration





  Keep Vein Open  














Discontinued Medications














Generic Name Dose Route Start Last Admin





  Trade Name Freq  PRN Reason Stop Dose Admin


 


Hydromorphone HCl  1 mg  05/19/21 00:58  05/19/21 02:04





  Hydromorphone 1 Mg/Ml Syringe  IVPUSH  05/19/21 00:59  1 mg





  ONETIME ONE   Administration


 


Sodium Chloride  1,000 mls @ 999 mls/hr  05/19/21 00:58  05/19/21 02:04





  Normal Saline  IV  05/19/21 01:58  999 mls/hr





  .Bolus ONE   Administration


 


Sodium Chloride  1,000 mls @ 999 mls/hr  05/19/21 00:59  05/19/21 02:04





  Normal Saline  IV  05/19/21 01:59  999 mls/hr





  .Bolus ONE   Administration


 


Iopamidol  100 ml  05/19/21 02:21  05/19/21 02:44





  Iopamidol 755 Mg/Ml 100 Ml Bottle  IVPUSH  05/19/21 02:22  100 ml





  ONETIME ONE   Administration


 


Ondansetron HCl  4 mg  05/19/21 00:58  05/19/21 02:04





  Ondansetron 4 Mg/2 Ml Sdv  IVPUSH  05/19/21 00:59  4 mg





  ONETIME ONE   Administration














Departure





- Departure


Time of Disposition: 04:28


Disposition: Home, Self-Care 01


Condition: Good


Clinical Impression: 


 Colitis, Diverticulitis








- Discharge Information


Instructions:  Colitis, Diverticulitis


Referrals: 


Sterling Dunbar MD [Primary Care Provider] - 


Additional Instructions: 


You were seen and evaluated in the ER today secondary to signs and symptoms 

similar to your prior presentation of diverticulitis.  Although your CT scan 

does not show lincoln diverticulitis there are signs of multifocal colitis or 

inflammation in your bowel.  We will get you started on ciprofloxacin and 

Flagyl.  Please return the ER if your symptoms change or worsen or if you should

change her mind and feel that you would need to be admitted to the hospital.  

Please make an appointment to see your family doctor within the next 1 to 2 days

for reevaluation.





Ciprofloxacin 500 mg p.o. twice daily


Flagyl 500 mg 3 times a day x10 days


Zofran 4 mg ODT as needed for nausea


Ultram 50 mg as needed for pain





The following information is given to patients seen in the emergency department 

who are being discharged to home. This information is to outline your options 

for follow-up care. We provide all patients seen in our emergency department 

with a follow-up referral.





The need for follow-up, as well as the timing and circumstances, are variable 

depending upon the specifics of your emergency department visit.





If you don't have a primary care physician on staff, we will provide you with a 

referral. We always advise you to contact your personal physician following an 

emergency department visit to inform them of the circumstance of the visit and 

for follow-up with them and/or the need for any referrals to a consulting 

specialist.





The emergency department will also refer you to a specialist when appropriate. 

This referral assures that you have the opportunity for follow-up care with a 

specialist. All of these measure are taken in an effort to provide you with 

optimal care, which includes your follow-up.





Under all circumstances we always encourage you to contact your private 

physician who remains a resource for coordinating your care. When calling for 

follow-up care, please make the office aware that this follow-up is from your 

recent emergency room visit. If for any reason you are refused follow-up, please

contact the [facility] Emergency Department

at [phone] and asked to speak to the emergency department charge nurse.





[facility]


[address] [place] [id number]


Phone: [phone]


Fax: [phone]








[facility]


[address] [place] [id number]


Phone: [phone]


Fax: [phone]








Sepsis Event Note (ED)





- Evaluation


Sepsis Screening Result: No Definite Risk





- Focused Exam


Vital Signs: 





                                   Vital Signs











  Temp Pulse Resp BP Pulse Ox


 


 05/19/21 04:19   93  20  173/103 H  97


 


 05/19/21 03:42  97.2 F  89  20  163/85 H  95


 


 05/19/21 00:36  97.6 F  83  18  158/99 H  96














- My Orders


Last 24 Hours: 





My Active Orders





05/19/21 00:58


EKG Documentation Completion [RC] AM 


Sodium Chloride 0.9% [Saline Flush]   10 ml FLUSH ASDIRECTED PRN 


Sodium Chloride 0.9% [Saline Flush]   2.5 ml FLUSH ASDIRECTED PRN 


Saline Lock Insert [OM.PC] Stat 





05/19/21 04:22


Ciprofloxacin [Ciprofloxacin HCl]   500 mg PO ONETIME ONE 


metroNIDAZOLE   500 mg PO ONETIME ONE 














- Assessment/Plan


Last 24 Hours: 





My Active Orders





05/19/21 00:58


EKG Documentation Completion [RC] AM 


Sodium Chloride 0.9% [Saline Flush]   10 ml FLUSH ASDIRECTED PRN 


Sodium Chloride 0.9% [Saline Flush]   2.5 ml FLUSH ASDIRECTED PRN 


Saline Lock Insert [OM.PC] Stat 





05/19/21 04:22


Ciprofloxacin [Ciprofloxacin HCl]   500 mg PO ONETIME ONE 


metroNIDAZOLE   500 mg PO ONETIME ONE